# Patient Record
Sex: FEMALE | URBAN - METROPOLITAN AREA
[De-identification: names, ages, dates, MRNs, and addresses within clinical notes are randomized per-mention and may not be internally consistent; named-entity substitution may affect disease eponyms.]

---

## 2018-09-17 ENCOUNTER — APPOINTMENT (RX ONLY)
Dept: URBAN - METROPOLITAN AREA CLINIC 150 | Facility: CLINIC | Age: 83
Setting detail: DERMATOLOGY
End: 2018-09-17

## 2018-09-17 DIAGNOSIS — L57.0 ACTINIC KERATOSIS: ICD-10-CM

## 2018-09-17 PROBLEM — M12.9 ARTHROPATHY, UNSPECIFIED: Status: ACTIVE | Noted: 2018-09-17

## 2018-09-17 PROBLEM — E03.9 HYPOTHYROIDISM, UNSPECIFIED: Status: ACTIVE | Noted: 2018-09-17

## 2018-09-17 PROCEDURE — 17000 DESTRUCT PREMALG LESION: CPT

## 2018-09-17 PROCEDURE — ? LIQUID NITROGEN

## 2018-09-17 PROCEDURE — 99213 OFFICE O/P EST LOW 20 MIN: CPT | Mod: 25

## 2018-09-17 PROCEDURE — ? COUNSELING

## 2018-09-17 PROCEDURE — ? SEPARATE AND IDENTIFIABLE DOCUMENTATION

## 2018-09-17 ASSESSMENT — LOCATION ZONE DERM
LOCATION ZONE: NOSE
LOCATION ZONE: SCALP

## 2018-09-17 ASSESSMENT — LOCATION SIMPLE DESCRIPTION DERM
LOCATION SIMPLE: SCALP
LOCATION SIMPLE: NOSE

## 2018-09-17 ASSESSMENT — LOCATION DETAILED DESCRIPTION DERM
LOCATION DETAILED: NASAL DORSUM
LOCATION DETAILED: LEFT SUPERIOR PARIETAL SCALP

## 2018-09-17 ASSESSMENT — PAIN INTENSITY VAS: HOW INTENSE IS YOUR PAIN 0 BEING NO PAIN, 10 BEING THE MOST SEVERE PAIN POSSIBLE?: NO PAIN

## 2018-09-17 NOTE — HPI: SKIN LESION
How Severe Is Your Skin Lesion?: moderate
Has Your Skin Lesion Been Treated?: not been treated
Is This A New Presentation, Or A Follow-Up?: Skin Lesions
Which Family Member (Optional)?:

## 2018-09-17 NOTE — PROCEDURE: LIQUID NITROGEN
Detail Level: Detailed
Consent: The patient's verbal consent was obtained including but not limited to risks of crusting, scabbing, blistering, scarring, darker or lighter pigmentary change, recurrence, incomplete removal and infection.
Number Of Freeze-Thaw Cycles: 1 freeze-thaw cycle
Post-Care Instructions: I reviewed with the patient in detail post-care instructions. Patient is to wear sunprotection, and avoid picking at any of the treated lesions. Pt may apply Aquaphor to crusted or scabbing areas and may take Tylenol for pain after the treatment.
Duration Of Freeze Thaw-Cycle (Seconds): 15
Render Post-Care Instructions In Note?: yes

## 2021-08-12 ENCOUNTER — PREPPED CHART (OUTPATIENT)
Dept: URBAN - METROPOLITAN AREA CLINIC 34 | Facility: CLINIC | Age: 86
End: 2021-08-12

## 2021-09-16 ENCOUNTER — FOLLOW UP (OUTPATIENT)
Dept: URBAN - METROPOLITAN AREA CLINIC 34 | Facility: CLINIC | Age: 86
End: 2021-09-16

## 2021-09-16 DIAGNOSIS — H40.1132: ICD-10-CM

## 2021-09-16 DIAGNOSIS — H35.351: ICD-10-CM

## 2021-09-16 DIAGNOSIS — H18.511: ICD-10-CM

## 2021-09-16 PROCEDURE — 92012 INTRM OPH EXAM EST PATIENT: CPT

## 2021-09-16 PROCEDURE — 92134 CPTRZ OPH DX IMG PST SGM RTA: CPT

## 2021-09-16 ASSESSMENT — TONOMETRY
OD_IOP_MMHG: 12
OS_IOP_MMHG: 12

## 2021-09-16 ASSESSMENT — VISUAL ACUITY: OD_SC: 20/80-1

## 2021-10-28 ENCOUNTER — FOLLOW UP (OUTPATIENT)
Dept: URBAN - METROPOLITAN AREA CLINIC 34 | Facility: CLINIC | Age: 86
End: 2021-10-28

## 2021-10-28 DIAGNOSIS — H18.511: ICD-10-CM

## 2021-10-28 DIAGNOSIS — H35.351: ICD-10-CM

## 2021-10-28 DIAGNOSIS — H35.341: ICD-10-CM

## 2021-10-28 PROCEDURE — 92012 INTRM OPH EXAM EST PATIENT: CPT

## 2021-10-28 PROCEDURE — 92134 CPTRZ OPH DX IMG PST SGM RTA: CPT

## 2021-10-28 ASSESSMENT — TONOMETRY
OS_IOP_MMHG: 16
OD_IOP_MMHG: 16

## 2021-10-28 ASSESSMENT — VISUAL ACUITY
OD_CC: 20/150
OD_SC: 20/100-1

## 2022-01-06 ENCOUNTER — FOLLOW UP (OUTPATIENT)
Dept: URBAN - METROPOLITAN AREA CLINIC 34 | Facility: CLINIC | Age: 87
End: 2022-01-06

## 2022-01-06 DIAGNOSIS — H27.02: ICD-10-CM

## 2022-01-06 DIAGNOSIS — H40.1132: ICD-10-CM

## 2022-01-06 DIAGNOSIS — H35.351: ICD-10-CM

## 2022-01-06 DIAGNOSIS — H35.341: ICD-10-CM

## 2022-01-06 DIAGNOSIS — H18.511: ICD-10-CM

## 2022-01-06 DIAGNOSIS — H04.123: ICD-10-CM

## 2022-01-06 PROCEDURE — 92012 INTRM OPH EXAM EST PATIENT: CPT

## 2022-01-06 ASSESSMENT — TONOMETRY
OD_IOP_MMHG: 15
OS_IOP_MMHG: 15

## 2022-01-06 ASSESSMENT — VISUAL ACUITY
OD_SC: 20/100
OS_SC: CF 1FT

## 2022-04-07 ENCOUNTER — FOLLOW UP (OUTPATIENT)
Dept: URBAN - METROPOLITAN AREA CLINIC 34 | Facility: CLINIC | Age: 87
End: 2022-04-07

## 2022-04-07 DIAGNOSIS — H18.511: ICD-10-CM

## 2022-04-07 PROCEDURE — 92012 INTRM OPH EXAM EST PATIENT: CPT

## 2022-04-07 ASSESSMENT — TONOMETRY
OS_IOP_MMHG: 12
OD_IOP_MMHG: 12

## 2022-04-07 ASSESSMENT — VISUAL ACUITY
OS_SC: CF 1FT
OD_SC: 20/200

## 2022-10-21 ENCOUNTER — FOLLOW UP (OUTPATIENT)
Dept: URBAN - METROPOLITAN AREA CLINIC 34 | Facility: CLINIC | Age: 87
End: 2022-10-21

## 2022-10-21 DIAGNOSIS — H40.1132: ICD-10-CM

## 2022-10-21 DIAGNOSIS — Z96.1: ICD-10-CM

## 2022-10-21 DIAGNOSIS — H35.341: ICD-10-CM

## 2022-10-21 DIAGNOSIS — H18.511: ICD-10-CM

## 2022-10-21 DIAGNOSIS — H52.03: ICD-10-CM

## 2022-10-21 DIAGNOSIS — H27.02: ICD-10-CM

## 2022-10-21 DIAGNOSIS — H35.351: ICD-10-CM

## 2022-10-21 DIAGNOSIS — H04.123: ICD-10-CM

## 2022-10-21 PROCEDURE — 92012 INTRM OPH EXAM EST PATIENT: CPT

## 2022-10-21 ASSESSMENT — VISUAL ACUITY
OS_SC: CF 1FT
OD_SC: 20/200

## 2022-10-21 ASSESSMENT — TONOMETRY
OD_IOP_MMHG: 12
OS_IOP_MMHG: 12

## 2023-02-02 ENCOUNTER — PROBLEM (OUTPATIENT)
Dept: URBAN - METROPOLITAN AREA CLINIC 34 | Facility: CLINIC | Age: 88
End: 2023-02-02

## 2023-02-02 DIAGNOSIS — Z96.1: ICD-10-CM

## 2023-02-02 DIAGNOSIS — H57.11: ICD-10-CM

## 2023-02-02 DIAGNOSIS — H18.511: ICD-10-CM

## 2023-02-02 DIAGNOSIS — H52.03: ICD-10-CM

## 2023-02-02 PROCEDURE — 92012 INTRM OPH EXAM EST PATIENT: CPT

## 2023-02-02 ASSESSMENT — TONOMETRY
OD_IOP_MMHG: 10
OS_IOP_MMHG: 10

## 2023-04-27 ENCOUNTER — FOLLOW UP (OUTPATIENT)
Dept: URBAN - METROPOLITAN AREA CLINIC 34 | Facility: CLINIC | Age: 88
End: 2023-04-27

## 2023-04-27 DIAGNOSIS — H40.1132: ICD-10-CM

## 2023-04-27 DIAGNOSIS — H57.11: ICD-10-CM

## 2023-04-27 DIAGNOSIS — H18.511: ICD-10-CM

## 2023-04-27 PROCEDURE — 92012 INTRM OPH EXAM EST PATIENT: CPT

## 2023-04-27 ASSESSMENT — TONOMETRY
OD_IOP_MMHG: 12
OS_IOP_MMHG: 12

## (undated) RX ORDER — DUREZOL 0.5 MG/ML: 1 EMULSION OPHTHALMIC